# Patient Record
(demographics unavailable — no encounter records)

---

## 2018-11-13 NOTE — OP CLINIC PROGRESS NOTE
SUBJECTIVE:

Wilbert Bergman is a 13-year-old male who presents with his father today in 
clinic for a painful right great toenail lateral border.  The patient states 
that he has had pain at this ingrown toenail site for several months or longer 
at this time.  He states that he was started on antibiotics recently by Pita Bryant in [facility] and he states that he is still working on finishing those 
antibiotics but that he will.   He does not admit to any fever, chills, nausea, 
vomiting, shortness of breath or chest pain at this time.  He has no further 
questions or concerns but would like to have this right great toenail lateral 
border removed and give it time to grow back and hopefully have no pain in the 
future.   



OBJECTIVE:

VITAL SIGNS:   T:  98.5 degrees Fahrenheit, heart rate 76, R: 18, BP:  108/44, 
oxygen saturation is 98%.  

VASCULAR:  DP and PT pulses of the right foot are 2+.  Capillary refill time was
less than 3 seconds to the toes of the right foot.  No edema is noted, outside 
of a small amount of edema off the lateral border of the right great toenail.  

DERMATOLOGIC:   There is a small granuloma and edematous tissue just adjacent to
the right great toenail lateral border.  There is minor red irritation noted but
no real warmth, erythema, malodor, or purulence at this time.  

MUSCULOSKELETAL:   There is pain on palpation noted to the right great toenail 
lateral border.  There are no other gross abnormalities noted of the right foot.


NEUROLOGIC:  Light touch sensation is intact to the toes of the right foot.  



ASSESSMENT:

1.   Onychocryptosis of the right foot.

2.   Paronychia of the right foot.

3.   Resolving infection of the right foot.



PROCEDURE #1:

Risks and benefits of a right great toenail lateral border partial avulsion was 
discussed that include, but are not limited to, bleeding, infection, etc.  The 
patient understood the risks and benefits, as well as his father, and agreed by 
both written and verbal consent to go forward with this procedure at this time. 
His consent was signed and placed in the chart. 



An alcohol swab was applied to clean the right great toe and the toe was 
injected with 6 mL of a 1:1 mix of 2% lidocaine plain and 0.5% bupivacaine 
plain.  The toe was then checked for anesthesia and found to have no pain.  A 
hemostat and a nail splitter were utilized to remove the lateral edge of the 
right great toenail.  After partial nail avulsion, the site was checked to make 
sure there was no other nail in the nail fold and it was found to be free of 
any.  A small amount of the granulomatous tissue along the nail fold was 
resected with the hemostat.  The wound site was flushed with a copious amount of
normal saline, dried, and triple antibiotic ointment and several 2 x 2 gauze 
followed by 2-inch Milton and 1-inch Coban were applied.  The patient was given 
instructions regarding aftercare and told that he is able to take off the 
dressings tomorrow and may wash it, as long as he washes his foot real well, 
dries it, and applies antibiotic ointment and a Band-Aid daily.  He was 
discouraged from any sort of a soaking.  Hemostasis was obtained during the 
procedure with pressure.  The patient tolerated the procedure very well.  



PLAN: 

Return to clinic in 1 week in the [facility] for follow up.  The 
patient was notified to look for any signs of redness or purulence/signs of 
infection and to notify us as soon as possible if he finds any.  The patient was
also encouraged to finish his antibiotics that he was prescribed before for the 
paronychia.  





cc:   REYNA Nobles